# Patient Record
Sex: MALE | Race: WHITE
[De-identification: names, ages, dates, MRNs, and addresses within clinical notes are randomized per-mention and may not be internally consistent; named-entity substitution may affect disease eponyms.]

---

## 2018-10-07 ENCOUNTER — HOSPITAL ENCOUNTER (EMERGENCY)
Dept: HOSPITAL 54 - ER | Age: 43
Discharge: HOME | End: 2018-10-07
Payer: MEDICAID

## 2018-10-07 VITALS — DIASTOLIC BLOOD PRESSURE: 85 MMHG | SYSTOLIC BLOOD PRESSURE: 142 MMHG

## 2018-10-07 VITALS — HEIGHT: 72 IN | BODY MASS INDEX: 33.18 KG/M2 | WEIGHT: 245 LBS

## 2018-10-07 DIAGNOSIS — L03.011: Primary | ICD-10-CM

## 2018-10-07 DIAGNOSIS — F17.200: ICD-10-CM

## 2018-10-07 PROCEDURE — A6402 STERILE GAUZE <= 16 SQ IN: HCPCS

## 2018-10-07 PROCEDURE — 99406 BEHAV CHNG SMOKING 3-10 MIN: CPT

## 2018-10-07 PROCEDURE — Z7610: HCPCS

## 2018-10-07 PROCEDURE — A4606 OXYGEN PROBE USED W OXIMETER: HCPCS

## 2018-10-07 PROCEDURE — 10060 I&D ABSCESS SIMPLE/SINGLE: CPT

## 2018-10-07 PROCEDURE — 99283 EMERGENCY DEPT VISIT LOW MDM: CPT

## 2018-10-07 NOTE — NUR
TD R deltoid IM .5ML lot# [id number] EXP 11/14/20. pT FOR DISCHARGE-aci GIVEN 
VERBALIZED UNDERSTANDING hOME AMBULATORY IN STABLE CONDITION